# Patient Record
Sex: MALE | URBAN - METROPOLITAN AREA
[De-identification: names, ages, dates, MRNs, and addresses within clinical notes are randomized per-mention and may not be internally consistent; named-entity substitution may affect disease eponyms.]

---

## 2020-11-02 ENCOUNTER — APPOINTMENT (OUTPATIENT)
Dept: URBAN - METROPOLITAN AREA CLINIC 282 | Age: 30
Setting detail: DERMATOLOGY
End: 2020-11-03

## 2020-11-02 DIAGNOSIS — L64.8 OTHER ANDROGENIC ALOPECIA: ICD-10-CM

## 2020-11-02 PROCEDURE — OTHER TREATMENT REGIMEN: OTHER

## 2020-11-02 PROCEDURE — OTHER COUNSELING: OTHER

## 2020-11-02 PROCEDURE — 99203 OFFICE O/P NEW LOW 30 MIN: CPT

## 2020-11-02 PROCEDURE — OTHER MIPS QUALITY: OTHER

## 2020-11-02 ASSESSMENT — LOCATION SIMPLE DESCRIPTION DERM: LOCATION SIMPLE: LEFT FOREHEAD

## 2020-11-02 ASSESSMENT — LOCATION ZONE DERM: LOCATION ZONE: FACE

## 2020-11-02 ASSESSMENT — LOCATION DETAILED DESCRIPTION DERM: LOCATION DETAILED: LEFT SUPERIOR MEDIAL FOREHEAD

## 2020-11-02 NOTE — HPI: HAIR LOSS
How Did The Hair Loss Occur?: sudden in onset
How Severe Is Your Hair Loss?: moderate
What Hair Products Do You Use?: Unknown

## 2020-11-02 NOTE — PROCEDURE: TREATMENT REGIMEN
Plan: Discussed starting finasteride, including side effects.  Pt was not interested given the side effects.\\nPt was interested in NutrAfol supplements and Rogaine foam.\\nDiscussed PRP, and recommended a consult with  at .
Detail Level: Zone